# Patient Record
Sex: FEMALE | Race: WHITE | Employment: OTHER | RURAL
[De-identification: names, ages, dates, MRNs, and addresses within clinical notes are randomized per-mention and may not be internally consistent; named-entity substitution may affect disease eponyms.]

---

## 2024-04-18 ENCOUNTER — HOSPITAL ENCOUNTER (EMERGENCY)
Facility: HOSPITAL | Age: 89
Discharge: HOME | End: 2024-04-18
Attending: EMERGENCY MEDICINE
Payer: MEDICARE

## 2024-04-18 ENCOUNTER — APPOINTMENT (OUTPATIENT)
Facility: HOSPITAL | Age: 89
End: 2024-04-18
Payer: MEDICARE

## 2024-04-18 VITALS
DIASTOLIC BLOOD PRESSURE: 49 MMHG | RESPIRATION RATE: 18 BRPM | HEART RATE: 92 BPM | BODY MASS INDEX: 32.15 KG/M2 | WEIGHT: 193 LBS | TEMPERATURE: 98 F | OXYGEN SATURATION: 97 % | SYSTOLIC BLOOD PRESSURE: 165 MMHG | HEIGHT: 65 IN

## 2024-04-18 DIAGNOSIS — I10 UNCONTROLLED HYPERTENSION: Primary | ICD-10-CM

## 2024-04-18 LAB
ANION GAP SERPL CALC-SCNC: 10 MMOL/L (ref 5–15)
BASOPHILS # BLD: 0.1 K/UL (ref 0–0.1)
BASOPHILS NFR BLD: 1 % (ref 0–1)
BUN SERPL-MCNC: 17 MG/DL (ref 6–20)
BUN/CREAT SERPL: 12 (ref 12–20)
CALCIUM SERPL-MCNC: 9.3 MG/DL (ref 8.5–10.1)
CHLORIDE SERPL-SCNC: 105 MMOL/L (ref 97–108)
CO2 SERPL-SCNC: 28 MMOL/L (ref 21–32)
CREAT SERPL-MCNC: 1.38 MG/DL (ref 0.55–1.02)
DIFFERENTIAL METHOD BLD: NORMAL
ECHO BSA: 2 M2
EKG ATRIAL RATE: 90 BPM
EKG DIAGNOSIS: NORMAL
EKG P AXIS: 43 DEGREES
EKG P-R INTERVAL: 170 MS
EKG Q-T INTERVAL: 410 MS
EKG QRS DURATION: 150 MS
EKG QTC CALCULATION (BAZETT): 501 MS
EKG R AXIS: -26 DEGREES
EKG T AXIS: 138 DEGREES
EKG VENTRICULAR RATE: 90 BPM
EOSINOPHIL # BLD: 0.3 K/UL (ref 0–0.4)
EOSINOPHIL NFR BLD: 3 % (ref 0–7)
ERYTHROCYTE [DISTWIDTH] IN BLOOD BY AUTOMATED COUNT: 12.5 % (ref 11.5–14.5)
GLUCOSE SERPL-MCNC: 109 MG/DL (ref 65–100)
HCT VFR BLD AUTO: 43.8 % (ref 35–47)
HGB BLD-MCNC: 14.9 G/DL (ref 11.5–16)
IMM GRANULOCYTES # BLD AUTO: 0 K/UL (ref 0–0.04)
IMM GRANULOCYTES NFR BLD AUTO: 0 % (ref 0–0.5)
LYMPHOCYTES # BLD: 1.6 K/UL (ref 0.8–3.5)
LYMPHOCYTES NFR BLD: 21 % (ref 12–49)
MCH RBC QN AUTO: 29.3 PG (ref 26–34)
MCHC RBC AUTO-ENTMCNC: 34 G/DL (ref 30–36.5)
MCV RBC AUTO: 86.1 FL (ref 80–99)
MONOCYTES # BLD: 0.7 K/UL (ref 0–1)
MONOCYTES NFR BLD: 9 % (ref 5–13)
NEUTS SEG # BLD: 5.3 K/UL (ref 1.8–8)
NEUTS SEG NFR BLD: 66 % (ref 32–75)
NRBC # BLD: 0 K/UL (ref 0–0.01)
NRBC BLD-RTO: 0 PER 100 WBC
PLATELET # BLD AUTO: 223 K/UL (ref 150–400)
PMV BLD AUTO: 9.7 FL (ref 8.9–12.9)
POTASSIUM SERPL-SCNC: 4.2 MMOL/L (ref 3.5–5.1)
RBC # BLD AUTO: 5.09 M/UL (ref 3.8–5.2)
SODIUM SERPL-SCNC: 143 MMOL/L (ref 136–145)
WBC # BLD AUTO: 7.9 K/UL (ref 3.6–11)

## 2024-04-18 PROCEDURE — 85025 COMPLETE CBC W/AUTO DIFF WBC: CPT

## 2024-04-18 PROCEDURE — 93971 EXTREMITY STUDY: CPT

## 2024-04-18 PROCEDURE — 36415 COLL VENOUS BLD VENIPUNCTURE: CPT

## 2024-04-18 PROCEDURE — 6370000000 HC RX 637 (ALT 250 FOR IP): Performed by: EMERGENCY MEDICINE

## 2024-04-18 PROCEDURE — 80048 BASIC METABOLIC PNL TOTAL CA: CPT

## 2024-04-18 PROCEDURE — 99284 EMERGENCY DEPT VISIT MOD MDM: CPT

## 2024-04-18 RX ORDER — AMLODIPINE BESYLATE 5 MG/1
5 TABLET ORAL DAILY
Qty: 30 TABLET | Refills: 0 | Status: SHIPPED | OUTPATIENT
Start: 2024-04-18

## 2024-04-18 RX ORDER — AMLODIPINE BESYLATE 5 MG/1
5 TABLET ORAL
Status: COMPLETED | OUTPATIENT
Start: 2024-04-18 | End: 2024-04-18

## 2024-04-18 RX ADMIN — AMLODIPINE BESYLATE 5 MG: 5 TABLET ORAL at 13:34

## 2024-04-18 ASSESSMENT — LIFESTYLE VARIABLES
HOW MANY STANDARD DRINKS CONTAINING ALCOHOL DO YOU HAVE ON A TYPICAL DAY: PATIENT DOES NOT DRINK
HOW OFTEN DO YOU HAVE A DRINK CONTAINING ALCOHOL: NEVER

## 2024-04-18 ASSESSMENT — PAIN - FUNCTIONAL ASSESSMENT: PAIN_FUNCTIONAL_ASSESSMENT: NONE - DENIES PAIN

## 2024-04-18 NOTE — ED PROVIDER NOTES
[facility] EMERGENCY DEP  EMERGENCY DEPARTMENT ENCOUNTER       Pt Name: Yana Esquivel  MRN: [id number]  Birthdate 1935  Date of evaluation: 4/18/2024  Provider: Chloe Soto MD   PCP: No primary care provider on file.  Note Started: 4:20 PM EDT 4/18/24     CHIEF COMPLAINT       Chief Complaint   Patient presents with    Hypertension        HISTORY OF PRESENT ILLNESS: 1 or more elements      History From: Patient, History limited by: none     Yana Esquivel is a 88 y.o. female presents to the emergency department complaining of elevated blood pressure and leg swelling.  Patient reports she has noted ankle swelling, left greater than right.  Reports she was told by her primary care doctor to stop taking her blood pressure medication in December.  Has not taken amlodipine since that time.  Also reports she had an episode on Monday where she felt like her head felt heavy.  Lasted only for few seconds but prompted her to check her blood pressure at which time she noted that it was high.  No calf pain.  No fever.  No headache, chest pain, shortness of breath.       Please See MDM for Additional Details of the HPI/PMH  Nursing Notes were all reviewed and agreed with or any disagreements were addressed in the HPI.     REVIEW OF SYSTEMS        Positives and Pertinent negatives as per HPI.    PAST HISTORY     Past Medical History:  Past Medical History:   Diagnosis Date    Diabetes (HCC)     Hypertension        Past Surgical History:  Past Surgical History:   Procedure Laterality Date    CHOLECYSTECTOMY         Family History:  No family history on file.    Social History:  Social History     Tobacco Use    Smoking status: Never    Smokeless tobacco: Never   Substance Use Topics    Alcohol use: Not Currently    Drug use: Never       Allergies:  Allergies   Allergen Reactions    Codeine Rash    Penicillins Rash       CURRENT MEDICATIONS      Discharge Medication List as of 4/18/2024  2:39 PM          SCREENINGS

## 2024-04-18 NOTE — ED TRIAGE NOTES
Pt reports that she has a pmhx of htn and DM. Pt states that her PCP told her she was not diabetic anymore and took her off of her rx meds, glipizide, losartan, and amlodipine. Pt states that she had an \"episode\" on Monday where her head felt heavy, which prompted her to check her BP, at that time her bp was 195/104. It has continued  to be elevated.  Pt also reports some ankle swelling.

## 2024-04-24 LAB
EKG ATRIAL RATE: 90 BPM
EKG DIAGNOSIS: NORMAL
EKG P AXIS: 43 DEGREES
EKG P-R INTERVAL: 170 MS
EKG Q-T INTERVAL: 410 MS
EKG QRS DURATION: 150 MS
EKG QTC CALCULATION (BAZETT): 501 MS
EKG R AXIS: -26 DEGREES
EKG T AXIS: 138 DEGREES
EKG VENTRICULAR RATE: 90 BPM

## 2024-09-12 ENCOUNTER — APPOINTMENT (OUTPATIENT)
Facility: HOSPITAL | Age: 89
End: 2024-09-12
Payer: MEDICARE

## 2024-09-12 ENCOUNTER — HOSPITAL ENCOUNTER (EMERGENCY)
Facility: HOSPITAL | Age: 89
Discharge: ANOTHER HOSPITAL | End: 2024-09-12
Attending: EMERGENCY MEDICINE
Payer: MEDICARE

## 2024-09-12 ENCOUNTER — HOSPITAL ENCOUNTER (INPATIENT)
Facility: HOSPITAL | Age: 89
LOS: 2 days | Discharge: HOME | End: 2024-09-14
Attending: STUDENT IN AN ORGANIZED HEALTH CARE EDUCATION/TRAINING PROGRAM | Admitting: STUDENT IN AN ORGANIZED HEALTH CARE EDUCATION/TRAINING PROGRAM
Payer: MEDICARE

## 2024-09-12 VITALS
HEIGHT: 65 IN | RESPIRATION RATE: 19 BRPM | BODY MASS INDEX: 30.51 KG/M2 | HEART RATE: 83 BPM | DIASTOLIC BLOOD PRESSURE: 64 MMHG | WEIGHT: 183.1 LBS | SYSTOLIC BLOOD PRESSURE: 122 MMHG | OXYGEN SATURATION: 98 % | TEMPERATURE: 98 F

## 2024-09-12 DIAGNOSIS — I21.4 NSTEMI (NON-ST ELEVATED MYOCARDIAL INFARCTION) (HCC): Primary | ICD-10-CM

## 2024-09-12 DIAGNOSIS — I47.10 PAROXYSMAL SUPRAVENTRICULAR TACHYCARDIA (HCC): ICD-10-CM

## 2024-09-12 DIAGNOSIS — I24.9 ACUTE CORONARY SYNDROME (HCC): ICD-10-CM

## 2024-09-12 LAB
ALBUMIN SERPL-MCNC: 3.6 G/DL (ref 3.5–5)
ALBUMIN SERPL-MCNC: 3.6 G/DL (ref 3.5–5)
ALBUMIN/GLOB SERPL: 1.2 (ref 1.1–2.2)
ALBUMIN/GLOB SERPL: 1.2 (ref 1.1–2.2)
ALP SERPL-CCNC: 61 U/L (ref 45–117)
ALP SERPL-CCNC: 65 U/L (ref 45–117)
ALT SERPL-CCNC: 17 U/L (ref 12–78)
ALT SERPL-CCNC: 18 U/L (ref 12–78)
ANION GAP SERPL CALC-SCNC: 11 MMOL/L (ref 2–12)
ANION GAP SERPL CALC-SCNC: 7 MMOL/L (ref 2–12)
APPEARANCE UR: CLEAR
AST SERPL-CCNC: 18 U/L (ref 15–37)
AST SERPL-CCNC: 21 U/L (ref 15–37)
BACTERIA URNS QL MICRO: NEGATIVE /HPF
BASOPHILS # BLD: 0.1 K/UL (ref 0–0.1)
BASOPHILS # BLD: 0.1 K/UL (ref 0–0.1)
BASOPHILS NFR BLD: 0 % (ref 0–1)
BASOPHILS NFR BLD: 1 % (ref 0–1)
BILIRUB SERPL-MCNC: 0.7 MG/DL (ref 0.2–1)
BILIRUB SERPL-MCNC: 0.8 MG/DL (ref 0.2–1)
BILIRUB UR QL: NEGATIVE
BUN SERPL-MCNC: 16 MG/DL (ref 6–20)
BUN SERPL-MCNC: 18 MG/DL (ref 6–20)
BUN/CREAT SERPL: 11 (ref 12–20)
BUN/CREAT SERPL: 13 (ref 12–20)
CALCIUM SERPL-MCNC: 9.1 MG/DL (ref 8.5–10.1)
CALCIUM SERPL-MCNC: 9.5 MG/DL (ref 8.5–10.1)
CHLORIDE SERPL-SCNC: 102 MMOL/L (ref 97–108)
CHLORIDE SERPL-SCNC: 108 MMOL/L (ref 97–108)
CO2 SERPL-SCNC: 26 MMOL/L (ref 21–32)
CO2 SERPL-SCNC: 26 MMOL/L (ref 21–32)
COLOR UR: ABNORMAL
CREAT SERPL-MCNC: 1.35 MG/DL (ref 0.55–1.02)
CREAT SERPL-MCNC: 1.45 MG/DL (ref 0.55–1.02)
D DIMER PPP FEU-MCNC: 0.8 MG/L FEU (ref 0–0.65)
DIFFERENTIAL METHOD BLD: ABNORMAL
DIFFERENTIAL METHOD BLD: ABNORMAL
EOSINOPHIL # BLD: 0.2 K/UL (ref 0–0.4)
EOSINOPHIL # BLD: 0.3 K/UL (ref 0–0.4)
EOSINOPHIL NFR BLD: 1 % (ref 0–7)
EOSINOPHIL NFR BLD: 3 % (ref 0–7)
EPITH CASTS URNS QL MICRO: ABNORMAL /LPF
ERYTHROCYTE [DISTWIDTH] IN BLOOD BY AUTOMATED COUNT: 12.3 % (ref 11.5–14.5)
ERYTHROCYTE [DISTWIDTH] IN BLOOD BY AUTOMATED COUNT: 12.3 % (ref 11.5–14.5)
FLUAV RNA SPEC QL NAA+PROBE: NOT DETECTED
FLUBV RNA SPEC QL NAA+PROBE: NOT DETECTED
GLOBULIN SER CALC-MCNC: 3 G/DL (ref 2–4)
GLOBULIN SER CALC-MCNC: 3.1 G/DL (ref 2–4)
GLUCOSE SERPL-MCNC: 123 MG/DL (ref 65–100)
GLUCOSE SERPL-MCNC: 201 MG/DL (ref 65–100)
GLUCOSE UR STRIP.AUTO-MCNC: NEGATIVE MG/DL
HCT VFR BLD AUTO: 36.6 % (ref 35–47)
HCT VFR BLD AUTO: 41.1 % (ref 35–47)
HGB BLD-MCNC: 12.8 G/DL (ref 11.5–16)
HGB BLD-MCNC: 14.4 G/DL (ref 11.5–16)
HGB UR QL STRIP: NEGATIVE
IMM GRANULOCYTES # BLD AUTO: 0 K/UL (ref 0–0.04)
IMM GRANULOCYTES # BLD AUTO: 0.1 K/UL (ref 0–0.04)
IMM GRANULOCYTES NFR BLD AUTO: 0 % (ref 0–0.5)
IMM GRANULOCYTES NFR BLD AUTO: 0 % (ref 0–0.5)
KETONES UR QL STRIP.AUTO: NEGATIVE MG/DL
LEUKOCYTE ESTERASE UR QL STRIP.AUTO: NEGATIVE
LIPASE SERPL-CCNC: 63 U/L (ref 13–75)
LYMPHOCYTES # BLD: 2.1 K/UL (ref 0.8–3.5)
LYMPHOCYTES # BLD: 2.4 K/UL (ref 0.8–3.5)
LYMPHOCYTES NFR BLD: 17 % (ref 12–49)
LYMPHOCYTES NFR BLD: 19 % (ref 12–49)
MAGNESIUM SERPL-MCNC: 2 MG/DL (ref 1.6–2.4)
MCH RBC QN AUTO: 30.5 PG (ref 26–34)
MCH RBC QN AUTO: 30.8 PG (ref 26–34)
MCHC RBC AUTO-ENTMCNC: 35 G/DL (ref 30–36.5)
MCHC RBC AUTO-ENTMCNC: 35 G/DL (ref 30–36.5)
MCV RBC AUTO: 87.1 FL (ref 80–99)
MCV RBC AUTO: 88 FL (ref 80–99)
MONOCYTES # BLD: 0.8 K/UL (ref 0–1)
MONOCYTES # BLD: 1 K/UL (ref 0–1)
MONOCYTES NFR BLD: 6 % (ref 5–13)
MONOCYTES NFR BLD: 8 % (ref 5–13)
NEUTS SEG # BLD: 8.9 K/UL (ref 1.8–8)
NEUTS SEG # BLD: 9.1 K/UL (ref 1.8–8)
NEUTS SEG NFR BLD: 72 % (ref 32–75)
NEUTS SEG NFR BLD: 73 % (ref 32–75)
NITRITE UR QL STRIP.AUTO: NEGATIVE
NRBC # BLD: 0 K/UL (ref 0–0.01)
NRBC # BLD: 0 K/UL (ref 0–0.01)
NRBC BLD-RTO: 0 PER 100 WBC
NRBC BLD-RTO: 0 PER 100 WBC
PH UR STRIP: 6 (ref 5–8)
PLATELET # BLD AUTO: 263 K/UL (ref 150–400)
PLATELET # BLD AUTO: 290 K/UL (ref 150–400)
PMV BLD AUTO: 9.2 FL (ref 8.9–12.9)
PMV BLD AUTO: 9.3 FL (ref 8.9–12.9)
POTASSIUM SERPL-SCNC: 3.9 MMOL/L (ref 3.5–5.1)
POTASSIUM SERPL-SCNC: 4.4 MMOL/L (ref 3.5–5.1)
PROT SERPL-MCNC: 6.6 G/DL (ref 6.4–8.2)
PROT SERPL-MCNC: 6.7 G/DL (ref 6.4–8.2)
PROT UR STRIP-MCNC: ABNORMAL MG/DL
RBC # BLD AUTO: 4.16 M/UL (ref 3.8–5.2)
RBC # BLD AUTO: 4.72 M/UL (ref 3.8–5.2)
RBC #/AREA URNS HPF: ABNORMAL /HPF (ref 0–5)
SARS-COV-2 RNA RESP QL NAA+PROBE: NOT DETECTED
SODIUM SERPL-SCNC: 139 MMOL/L (ref 136–145)
SODIUM SERPL-SCNC: 141 MMOL/L (ref 136–145)
SOURCE: NORMAL
SP GR UR REFRACTOMETRY: 1.02 (ref 1–1.03)
TROPONIN I SERPL HS-MCNC: 314 NG/L (ref 0–51)
TROPONIN I SERPL HS-MCNC: 41 NG/L (ref 0–51)
TROPONIN I SERPL HS-MCNC: 791 NG/L (ref 0–51)
TSH SERPL DL<=0.05 MIU/L-ACNC: 1.5 UIU/ML (ref 0.36–3.74)
URINE CULTURE IF INDICATED: ABNORMAL
UROBILINOGEN UR QL STRIP.AUTO: 0.2 EU/DL (ref 0.2–1)
WBC # BLD AUTO: 12.2 K/UL (ref 3.6–11)
WBC # BLD AUTO: 12.8 K/UL (ref 3.6–11)
WBC URNS QL MICRO: ABNORMAL /HPF (ref 0–4)

## 2024-09-12 PROCEDURE — 85025 COMPLETE CBC W/AUTO DIFF WBC: CPT

## 2024-09-12 PROCEDURE — 6360000004 HC RX CONTRAST MEDICATION: Performed by: RADIOLOGY

## 2024-09-12 PROCEDURE — 81001 URINALYSIS AUTO W/SCOPE: CPT

## 2024-09-12 PROCEDURE — 2060000000 HC ICU INTERMEDIATE R&B

## 2024-09-12 PROCEDURE — 96372 THER/PROPH/DIAG INJ SC/IM: CPT

## 2024-09-12 PROCEDURE — 6360000002 HC RX W HCPCS: Performed by: EMERGENCY MEDICINE

## 2024-09-12 PROCEDURE — 83735 ASSAY OF MAGNESIUM: CPT

## 2024-09-12 PROCEDURE — 93005 ELECTROCARDIOGRAM TRACING: CPT | Performed by: EMERGENCY MEDICINE

## 2024-09-12 PROCEDURE — 83036 HEMOGLOBIN GLYCOSYLATED A1C: CPT

## 2024-09-12 PROCEDURE — 84484 ASSAY OF TROPONIN QUANT: CPT

## 2024-09-12 PROCEDURE — 2500000003 HC RX 250 WO HCPCS: Performed by: STUDENT IN AN ORGANIZED HEALTH CARE EDUCATION/TRAINING PROGRAM

## 2024-09-12 PROCEDURE — 85379 FIBRIN DEGRADATION QUANT: CPT

## 2024-09-12 PROCEDURE — 93005 ELECTROCARDIOGRAM TRACING: CPT | Performed by: STUDENT IN AN ORGANIZED HEALTH CARE EDUCATION/TRAINING PROGRAM

## 2024-09-12 PROCEDURE — 36415 COLL VENOUS BLD VENIPUNCTURE: CPT

## 2024-09-12 PROCEDURE — 80053 COMPREHEN METABOLIC PANEL: CPT

## 2024-09-12 PROCEDURE — 2580000003 HC RX 258: Performed by: STUDENT IN AN ORGANIZED HEALTH CARE EDUCATION/TRAINING PROGRAM

## 2024-09-12 PROCEDURE — 99285 EMERGENCY DEPT VISIT HI MDM: CPT

## 2024-09-12 PROCEDURE — 83690 ASSAY OF LIPASE: CPT

## 2024-09-12 PROCEDURE — 6370000000 HC RX 637 (ALT 250 FOR IP): Performed by: EMERGENCY MEDICINE

## 2024-09-12 PROCEDURE — 87636 SARSCOV2 & INF A&B AMP PRB: CPT

## 2024-09-12 PROCEDURE — 84443 ASSAY THYROID STIM HORMONE: CPT

## 2024-09-12 PROCEDURE — 71275 CT ANGIOGRAPHY CHEST: CPT

## 2024-09-12 PROCEDURE — 71046 X-RAY EXAM CHEST 2 VIEWS: CPT

## 2024-09-12 PROCEDURE — 96374 THER/PROPH/DIAG INJ IV PUSH: CPT

## 2024-09-12 PROCEDURE — 93970 EXTREMITY STUDY: CPT

## 2024-09-12 PROCEDURE — 80061 LIPID PANEL: CPT

## 2024-09-12 RX ORDER — HEPARIN SODIUM 1000 [USP'U]/ML
30 INJECTION, SOLUTION INTRAVENOUS; SUBCUTANEOUS PRN
Status: DISCONTINUED | OUTPATIENT
Start: 2024-09-12 | End: 2024-09-12

## 2024-09-12 RX ORDER — ATORVASTATIN CALCIUM 40 MG/1
80 TABLET, FILM COATED ORAL DAILY
Status: DISCONTINUED | OUTPATIENT
Start: 2024-09-13 | End: 2024-09-13

## 2024-09-12 RX ORDER — HEPARIN SODIUM 1000 [USP'U]/ML
2000 INJECTION, SOLUTION INTRAVENOUS; SUBCUTANEOUS PRN
Status: DISCONTINUED | OUTPATIENT
Start: 2024-09-12 | End: 2024-09-13

## 2024-09-12 RX ORDER — HEPARIN SODIUM 10000 [USP'U]/100ML
5-30 INJECTION, SOLUTION INTRAVENOUS CONTINUOUS
Status: DISCONTINUED | OUTPATIENT
Start: 2024-09-13 | End: 2024-09-13 | Stop reason: SDUPTHER

## 2024-09-12 RX ORDER — SODIUM CHLORIDE 0.9 % (FLUSH) 0.9 %
5-40 SYRINGE (ML) INJECTION EVERY 12 HOURS SCHEDULED
Status: DISCONTINUED | OUTPATIENT
Start: 2024-09-12 | End: 2024-09-14 | Stop reason: HOSPADM

## 2024-09-12 RX ORDER — ONDANSETRON 4 MG/1
4 TABLET, ORALLY DISINTEGRATING ORAL EVERY 8 HOURS PRN
Status: DISCONTINUED | OUTPATIENT
Start: 2024-09-12 | End: 2024-09-14 | Stop reason: HOSPADM

## 2024-09-12 RX ORDER — LOSARTAN POTASSIUM 50 MG/1
50 TABLET ORAL DAILY
Status: DISCONTINUED | OUTPATIENT
Start: 2024-09-12 | End: 2024-09-14 | Stop reason: HOSPADM

## 2024-09-12 RX ORDER — ATORVASTATIN CALCIUM 40 MG/1
40 TABLET, FILM COATED ORAL DAILY
COMMUNITY

## 2024-09-12 RX ORDER — SODIUM CHLORIDE 9 MG/ML
INJECTION, SOLUTION INTRAVENOUS PRN
Status: DISCONTINUED | OUTPATIENT
Start: 2024-09-12 | End: 2024-09-14 | Stop reason: HOSPADM

## 2024-09-12 RX ORDER — HEPARIN SODIUM 10000 [USP'U]/100ML
5-30 INJECTION, SOLUTION INTRAVENOUS CONTINUOUS
Status: DISCONTINUED | OUTPATIENT
Start: 2024-09-12 | End: 2024-09-12

## 2024-09-12 RX ORDER — IOPAMIDOL 755 MG/ML
100 INJECTION, SOLUTION INTRAVASCULAR
Status: COMPLETED | OUTPATIENT
Start: 2024-09-12 | End: 2024-09-12

## 2024-09-12 RX ORDER — ASPIRIN 81 MG/1
324 TABLET, CHEWABLE ORAL ONCE
Status: COMPLETED | OUTPATIENT
Start: 2024-09-12 | End: 2024-09-12

## 2024-09-12 RX ORDER — BENZONATATE 100 MG/1
100 CAPSULE ORAL 3 TIMES DAILY PRN
Status: DISCONTINUED | OUTPATIENT
Start: 2024-09-12 | End: 2024-09-14 | Stop reason: HOSPADM

## 2024-09-12 RX ORDER — ONDANSETRON 2 MG/ML
4 INJECTION INTRAMUSCULAR; INTRAVENOUS EVERY 4 HOURS PRN
Status: DISCONTINUED | OUTPATIENT
Start: 2024-09-12 | End: 2024-09-12 | Stop reason: SDUPTHER

## 2024-09-12 RX ORDER — DILTIAZEM HYDROCHLORIDE 5 MG/ML
10 INJECTION INTRAVENOUS
Status: COMPLETED | OUTPATIENT
Start: 2024-09-12 | End: 2024-09-12

## 2024-09-12 RX ORDER — ASPIRIN 81 MG/1
81 TABLET, CHEWABLE ORAL DAILY
Status: DISCONTINUED | OUTPATIENT
Start: 2024-09-13 | End: 2024-09-14 | Stop reason: HOSPADM

## 2024-09-12 RX ORDER — HEPARIN SODIUM 1000 [USP'U]/ML
60 INJECTION, SOLUTION INTRAVENOUS; SUBCUTANEOUS PRN
Status: DISCONTINUED | OUTPATIENT
Start: 2024-09-12 | End: 2024-09-12

## 2024-09-12 RX ORDER — 0.9 % SODIUM CHLORIDE 0.9 %
1000 INTRAVENOUS SOLUTION INTRAVENOUS ONCE
Status: COMPLETED | OUTPATIENT
Start: 2024-09-12 | End: 2024-09-12

## 2024-09-12 RX ORDER — HEPARIN SODIUM 1000 [USP'U]/ML
4000 INJECTION, SOLUTION INTRAVENOUS; SUBCUTANEOUS PRN
Status: DISCONTINUED | OUTPATIENT
Start: 2024-09-12 | End: 2024-09-13

## 2024-09-12 RX ORDER — ALBUTEROL SULFATE 90 UG/1
2 INHALANT RESPIRATORY (INHALATION) EVERY 6 HOURS PRN
COMMUNITY

## 2024-09-12 RX ORDER — SODIUM CHLORIDE 0.9 % (FLUSH) 0.9 %
5-40 SYRINGE (ML) INJECTION PRN
Status: DISCONTINUED | OUTPATIENT
Start: 2024-09-12 | End: 2024-09-14 | Stop reason: HOSPADM

## 2024-09-12 RX ORDER — ACETAMINOPHEN 325 MG/1
650 TABLET ORAL EVERY 6 HOURS PRN
Status: DISCONTINUED | OUTPATIENT
Start: 2024-09-12 | End: 2024-09-14 | Stop reason: HOSPADM

## 2024-09-12 RX ORDER — LOSARTAN POTASSIUM 50 MG/1
50 TABLET ORAL DAILY
COMMUNITY

## 2024-09-12 RX ORDER — LIDOCAINE HYDROCHLORIDE 20 MG/ML
15 SOLUTION OROPHARYNGEAL
Status: COMPLETED | OUTPATIENT
Start: 2024-09-12 | End: 2024-09-12

## 2024-09-12 RX ORDER — LANOLIN ALCOHOL/MO/W.PET/CERES
3 CREAM (GRAM) TOPICAL NIGHTLY PRN
Status: DISCONTINUED | OUTPATIENT
Start: 2024-09-12 | End: 2024-09-14 | Stop reason: HOSPADM

## 2024-09-12 RX ORDER — ACETAMINOPHEN 325 MG/1
650 TABLET ORAL EVERY 4 HOURS PRN
Status: DISCONTINUED | OUTPATIENT
Start: 2024-09-12 | End: 2024-09-12

## 2024-09-12 RX ORDER — ACETAMINOPHEN 650 MG/1
650 SUPPOSITORY RECTAL EVERY 6 HOURS PRN
Status: DISCONTINUED | OUTPATIENT
Start: 2024-09-12 | End: 2024-09-14 | Stop reason: HOSPADM

## 2024-09-12 RX ORDER — POLYETHYLENE GLYCOL 3350 17 G/17G
17 POWDER, FOR SOLUTION ORAL DAILY PRN
Status: DISCONTINUED | OUTPATIENT
Start: 2024-09-12 | End: 2024-09-14 | Stop reason: HOSPADM

## 2024-09-12 RX ORDER — MAGNESIUM HYDROXIDE/ALUMINUM HYDROXICE/SIMETHICONE 120; 1200; 1200 MG/30ML; MG/30ML; MG/30ML
30 SUSPENSION ORAL ONCE
Status: COMPLETED | OUTPATIENT
Start: 2024-09-12 | End: 2024-09-12

## 2024-09-12 RX ORDER — ONDANSETRON 2 MG/ML
4 INJECTION INTRAMUSCULAR; INTRAVENOUS EVERY 6 HOURS PRN
Status: DISCONTINUED | OUTPATIENT
Start: 2024-09-12 | End: 2024-09-14 | Stop reason: HOSPADM

## 2024-09-12 RX ORDER — ENOXAPARIN SODIUM 100 MG/ML
1 INJECTION SUBCUTANEOUS
Status: COMPLETED | OUTPATIENT
Start: 2024-09-12 | End: 2024-09-12

## 2024-09-12 RX ORDER — HEPARIN SODIUM 1000 [USP'U]/ML
60 INJECTION, SOLUTION INTRAVENOUS; SUBCUTANEOUS ONCE
Status: DISCONTINUED | OUTPATIENT
Start: 2024-09-12 | End: 2024-09-12

## 2024-09-12 RX ADMIN — SODIUM CHLORIDE, PRESERVATIVE FREE 10 ML: 5 INJECTION INTRAVENOUS at 22:20

## 2024-09-12 RX ADMIN — LIDOCAINE HYDROCHLORIDE 15 ML: 20 SOLUTION ORAL at 10:20

## 2024-09-12 RX ADMIN — IOPAMIDOL 100 ML: 755 INJECTION, SOLUTION INTRAVENOUS at 20:57

## 2024-09-12 RX ADMIN — ASPIRIN 324 MG: 81 TABLET, CHEWABLE ORAL at 13:06

## 2024-09-12 RX ADMIN — DILTIAZEM HYDROCHLORIDE 10 MG: 5 INJECTION, SOLUTION INTRAVENOUS at 18:18

## 2024-09-12 RX ADMIN — SODIUM CHLORIDE 1000 ML: 9 INJECTION, SOLUTION INTRAVENOUS at 18:19

## 2024-09-12 RX ADMIN — ALUMINUM HYDROXIDE, MAGNESIUM HYDROXIDE, AND SIMETHICONE 30 ML: 1200; 120; 1200 SUSPENSION ORAL at 10:20

## 2024-09-12 RX ADMIN — ENOXAPARIN SODIUM 80 MG: 100 INJECTION SUBCUTANEOUS at 14:44

## 2024-09-12 ASSESSMENT — LIFESTYLE VARIABLES
HOW MANY STANDARD DRINKS CONTAINING ALCOHOL DO YOU HAVE ON A TYPICAL DAY: PATIENT DOES NOT DRINK
HOW OFTEN DO YOU HAVE A DRINK CONTAINING ALCOHOL: NEVER
HOW OFTEN DO YOU HAVE A DRINK CONTAINING ALCOHOL: NEVER
HOW MANY STANDARD DRINKS CONTAINING ALCOHOL DO YOU HAVE ON A TYPICAL DAY: PATIENT DOES NOT DRINK

## 2024-09-12 ASSESSMENT — PAIN SCALES - GENERAL
PAINLEVEL_OUTOF10: 6
PAINLEVEL_OUTOF10: 0
PAINLEVEL_OUTOF10: 6

## 2024-09-12 ASSESSMENT — PAIN - FUNCTIONAL ASSESSMENT: PAIN_FUNCTIONAL_ASSESSMENT: 0-10

## 2024-09-12 NOTE — ED NOTES
TRANSFER - OUT REPORT:    Verbal report given to Ramon Carpio EMT-P on Yana Esquivel  being transferred to [facility] 2115 for routine progression of patient care       Report consisted of patient's Situation, Background, Assessment and   Recommendations(SBAR).     Information from the following report(s) Nurse Handoff Report, ED SBAR, MAR, Recent Results, Med Rec Status, Cardiac Rhythm SNR w LBBB, and Quality Measures was reviewed with the receiving nurse.    [place] Fall Assessment:    Presents to emergency department  because of falls (Syncope, seizure, or loss of consciousness): No  Age > 70: Yes  Altered Mental Status, Intoxication with alcohol or substance confusion (Disorientation, impaired judgment, poor safety awaremess, or inability to follow instructions): No  Impaired Mobility: Ambulates or transfers with assistive devices or assistance; Unable to ambulate or transer.: No  Nursing Judgement: No          Lines:   Peripheral IV 09/12/24 Left Antecubital (Active)        Opportunity for questions and clarification was provided.      Patient transported with:  [facility] on continued cardiac monitoring, hemodynamic monitoring

## 2024-09-12 NOTE — ED NOTES
ED Provider at bedside to discuss with pt about plan of care, transfer to see if pt is able to be agreeable to current plan of transport to [facility] for further evaluation/cath lab. Pt expresses anxiety, reticence about cardiac catheterization.

## 2024-09-12 NOTE — ED NOTES
Meal tray has arrived for pt, ED Provider to evaluate before pt is cleared related to increased troponin.

## 2024-09-12 NOTE — ED NOTES
Pt refusal of further interventions right now until she can talk to her son. Pt now calling her son on her cell phone at this time. Pt initial refusal of lovenox. Educated on lovenox.

## 2024-09-12 NOTE — ED NOTES
TRANSFER - OUT REPORT:    Verbal report given to Lori Pike RN on Yana Esquivel  being transferred to [facility] 2115 for routine progression of patient care       Report consisted of patient's Situation, Background, Assessment and   Recommendations(SBAR).     Information from the following report(s) Nurse Handoff Report, ED SBAR, Adult Overview, MAR, Recent Results, Med Rec Status, Cardiac Rhythm NSR, and Quality Measures was reviewed with the receiving nurse.    [place] Fall Assessment:    Presents to emergency department  because of falls (Syncope, seizure, or loss of consciousness): No  Age > 70: Yes  Altered Mental Status, Intoxication with alcohol or substance confusion (Disorientation, impaired judgment, poor safety awaremess, or inability to follow instructions): No  Impaired Mobility: Ambulates or transfers with assistive devices or assistance; Unable to ambulate or transer.: No  Nursing Judgement: No          Lines:   Peripheral IV 09/12/24 Left Antecubital (Active)        Opportunity for questions and clarification was provided.      Patient transported with:  [facility] on Cardiac monitoring, continued hemodynamic monitoring.

## 2024-09-12 NOTE — ED TRIAGE NOTES
Pt arrives with c/o CP, upon triage pt reports right shoulder, jaw, arm pain that began approx 1.5 hours ago. Pt reports that she is diaphoretic.

## 2024-09-12 NOTE — ED NOTES
Pt has received bed assignment, [facility] Bed 2115, number for report [phone]. RN Supervisor aware, to arrange transport for pt at this time.

## 2024-09-12 NOTE — ED NOTES
[phone] This RN attempting to give report on pt being transferred to [facility] Bed 2115, x 2 attempts at this time, on hold.

## 2024-09-12 NOTE — ED NOTES
ED Provider at bedside to discuss results/plan of care and transfer to appropriate facility to pt at this time. Pt is somewhat reticent about transfer at this time.

## 2024-09-12 NOTE — ED NOTES
Pt still waiting to speak with ED Provider at this time and her son who she wants to join by phone. Pt would like this to occur before any further interventions.

## 2024-09-12 NOTE — ED NOTES
Per RN Supervisor, [id number] ETA to  pt is 1700. Pt reports wanting a cough drop or something for her throat right now. ED Provider informed.

## 2024-09-12 NOTE — PROGRESS NOTES
arranged ALS transportation from [facility] # ed 6 to [facility] 3265.     Arranged ALS transport w/[facility] -  advised to bring appropiate equipment - ETA of   1700 given - updated ED staff w/ETA

## 2024-09-13 ENCOUNTER — APPOINTMENT (OUTPATIENT)
Facility: HOSPITAL | Age: 89
End: 2024-09-13
Attending: STUDENT IN AN ORGANIZED HEALTH CARE EDUCATION/TRAINING PROGRAM
Payer: MEDICARE

## 2024-09-13 LAB
ANION GAP SERPL CALC-SCNC: 5 MMOL/L (ref 2–12)
APTT PPP: 29.2 SEC (ref 22.1–31)
APTT PPP: 51.1 SEC (ref 22.1–31)
BASOPHILS # BLD: 0.1 K/UL (ref 0–0.1)
BASOPHILS NFR BLD: 1 % (ref 0–1)
BUN SERPL-MCNC: 14 MG/DL (ref 6–20)
BUN/CREAT SERPL: 12 (ref 12–20)
CALCIUM SERPL-MCNC: 8.5 MG/DL (ref 8.5–10.1)
CHLORIDE SERPL-SCNC: 109 MMOL/L (ref 97–108)
CHOLEST SERPL-MCNC: 191 MG/DL
CO2 SERPL-SCNC: 27 MMOL/L (ref 21–32)
CREAT SERPL-MCNC: 1.14 MG/DL (ref 0.55–1.02)
DIFFERENTIAL METHOD BLD: NORMAL
ECHO AO ASC DIAM: 3.9 CM
ECHO AO ASCENDING AORTA INDEX: 2 CM/M2
ECHO AO ROOT DIAM: 3.8 CM
ECHO AO ROOT INDEX: 1.95 CM/M2
ECHO AR MAX VEL PISA: 3.6 M/S
ECHO AV AREA PEAK VELOCITY: 2.3 CM2
ECHO AV AREA/BSA PEAK VELOCITY: 1.2 CM2/M2
ECHO AV MEAN GRADIENT: 10 MMHG
ECHO AV PEAK GRADIENT: 22 MMHG
ECHO AV PEAK VELOCITY: 2.4 M/S
ECHO AV REGURGITANT PHT: 378.3 MILLISECOND
ECHO AV VELOCITY RATIO: 0.63
ECHO BSA: 2 M2
ECHO BSA: 2 M2
ECHO LA DIAMETER INDEX: 1.59 CM/M2
ECHO LA DIAMETER: 3.1 CM
ECHO LA TO AORTIC ROOT RATIO: 0.82
ECHO LV E' LATERAL VELOCITY: 3 CM/S
ECHO LV E' SEPTAL VELOCITY: 4 CM/S
ECHO LV EDV A4C: 125 ML
ECHO LV EDV INDEX A4C: 64 ML/M2
ECHO LV EJECTION FRACTION A4C: 29 %
ECHO LV EJECTION FRACTION BIPLANE: 40 % (ref 55–100)
ECHO LV ESV A4C: 88 ML
ECHO LV ESV INDEX A4C: 45 ML/M2
ECHO LV FRACTIONAL SHORTENING: 33 % (ref 28–44)
ECHO LV INTERNAL DIMENSION DIASTOLE INDEX: 2.36 CM/M2
ECHO LV INTERNAL DIMENSION DIASTOLIC: 4.6 CM (ref 3.9–5.3)
ECHO LV INTERNAL DIMENSION SYSTOLIC INDEX: 1.59 CM/M2
ECHO LV INTERNAL DIMENSION SYSTOLIC: 3.1 CM
ECHO LV IVSD: 1.1 CM (ref 0.6–0.9)
ECHO LV MASS 2D: 181.2 G (ref 67–162)
ECHO LV MASS INDEX 2D: 92.9 G/M2 (ref 43–95)
ECHO LV POSTERIOR WALL DIASTOLIC: 1.1 CM (ref 0.6–0.9)
ECHO LV RELATIVE WALL THICKNESS RATIO: 0.48
ECHO LVOT AREA: 3.8 CM2
ECHO LVOT DIAM: 2.2 CM
ECHO LVOT PEAK GRADIENT: 9 MMHG
ECHO LVOT PEAK VELOCITY: 1.5 M/S
ECHO MV A VELOCITY: 1.4 M/S
ECHO MV E DECELERATION TIME (DT): 195.3 MS
ECHO MV E VELOCITY: 0.63 M/S
ECHO MV E/A RATIO: 0.45
ECHO MV E/E' LATERAL: 21
ECHO MV E/E' RATIO (AVERAGED): 18.38
ECHO MV E/E' SEPTAL: 15.75
ECHO PV MAX VELOCITY: 1.5 M/S
ECHO PV PEAK GRADIENT: 9 MMHG
ECHO RV FREE WALL PEAK S': 9 CM/S
ECHO RV TAPSE: 1.7 CM (ref 1.7–?)
ECHO TV REGURGITANT MAX VELOCITY: 2.9 M/S
ECHO TV REGURGITANT PEAK GRADIENT: 34 MMHG
EKG ATRIAL RATE: 70 BPM
EKG ATRIAL RATE: 83 BPM
EKG DIAGNOSIS: NORMAL
EKG P AXIS: 32 DEGREES
EKG P AXIS: 43 DEGREES
EKG P-R INTERVAL: 162 MS
EKG P-R INTERVAL: 166 MS
EKG Q-T INTERVAL: 278 MS
EKG Q-T INTERVAL: 384 MS
EKG Q-T INTERVAL: 438 MS
EKG QRS DURATION: 146 MS
EKG QRS DURATION: 156 MS
EKG QRS DURATION: 158 MS
EKG QTC CALCULATION (BAZETT): 386 MS
EKG QTC CALCULATION (BAZETT): 453 MS
EKG QTC CALCULATION (BAZETT): 514 MS
EKG R AXIS: -28 DEGREES
EKG R AXIS: -42 DEGREES
EKG R AXIS: -46 DEGREES
EKG T AXIS: 129 DEGREES
EKG T AXIS: 131 DEGREES
EKG T AXIS: 138 DEGREES
EKG VENTRICULAR RATE: 160 BPM
EKG VENTRICULAR RATE: 61 BPM
EKG VENTRICULAR RATE: 83 BPM
EOSINOPHIL # BLD: 0.4 K/UL (ref 0–0.4)
EOSINOPHIL NFR BLD: 4 % (ref 0–7)
ERYTHROCYTE [DISTWIDTH] IN BLOOD BY AUTOMATED COUNT: 12.6 % (ref 11.5–14.5)
EST. AVERAGE GLUCOSE BLD GHB EST-MCNC: 128 MG/DL
GLUCOSE BLD STRIP.AUTO-MCNC: 101 MG/DL (ref 65–117)
GLUCOSE SERPL-MCNC: 123 MG/DL (ref 65–100)
HBA1C MFR BLD: 6.1 % (ref 4–5.6)
HCT VFR BLD AUTO: 35.2 % (ref 35–47)
HDLC SERPL-MCNC: 62 MG/DL
HDLC SERPL: 3.1 (ref 0–5)
HGB BLD-MCNC: 11.7 G/DL (ref 11.5–16)
IMM GRANULOCYTES # BLD AUTO: 0 K/UL (ref 0–0.04)
IMM GRANULOCYTES NFR BLD AUTO: 0 % (ref 0–0.5)
INR PPP: 1 (ref 0.9–1.1)
INR PPP: 1 (ref 0.9–1.1)
LDLC SERPL CALC-MCNC: 105.2 MG/DL (ref 0–100)
LYMPHOCYTES # BLD: 1.7 K/UL (ref 0.8–3.5)
LYMPHOCYTES NFR BLD: 17 % (ref 12–49)
MCH RBC QN AUTO: 29.9 PG (ref 26–34)
MCHC RBC AUTO-ENTMCNC: 33.2 G/DL (ref 30–36.5)
MCV RBC AUTO: 90 FL (ref 80–99)
MONOCYTES # BLD: 0.8 K/UL (ref 0–1)
MONOCYTES NFR BLD: 8 % (ref 5–13)
NEUTS SEG # BLD: 7.4 K/UL (ref 1.8–8)
NEUTS SEG NFR BLD: 70 % (ref 32–75)
NRBC # BLD: 0 K/UL (ref 0–0.01)
NRBC BLD-RTO: 0 PER 100 WBC
PLATELET # BLD AUTO: 226 K/UL (ref 150–400)
PMV BLD AUTO: 9.3 FL (ref 8.9–12.9)
POTASSIUM SERPL-SCNC: 4 MMOL/L (ref 3.5–5.1)
PROTHROMBIN TIME: 10.9 SEC (ref 9–11.1)
PROTHROMBIN TIME: 10.9 SEC (ref 9–11.1)
RBC # BLD AUTO: 3.91 M/UL (ref 3.8–5.2)
SERVICE CMNT-IMP: NORMAL
SODIUM SERPL-SCNC: 141 MMOL/L (ref 136–145)
THERAPEUTIC RANGE: ABNORMAL SECS (ref 58–77)
THERAPEUTIC RANGE: NORMAL SECS (ref 58–77)
TRIGL SERPL-MCNC: 119 MG/DL
TROPONIN I SERPL HS-MCNC: 609 NG/L (ref 0–51)
TROPONIN I SERPL HS-MCNC: 625 NG/L (ref 0–51)
UFH PPP CHRO-ACNC: 0.56 IU/ML
UFH PPP CHRO-ACNC: 0.97 IU/ML
VLDLC SERPL CALC-MCNC: 23.8 MG/DL
WBC # BLD AUTO: 10.5 K/UL (ref 3.6–11)

## 2024-09-13 PROCEDURE — 85520 HEPARIN ASSAY: CPT

## 2024-09-13 PROCEDURE — 2580000003 HC RX 258: Performed by: STUDENT IN AN ORGANIZED HEALTH CARE EDUCATION/TRAINING PROGRAM

## 2024-09-13 PROCEDURE — C8929 TTE W OR WO FOL WCON,DOPPLER: HCPCS

## 2024-09-13 PROCEDURE — 6360000004 HC RX CONTRAST MEDICATION: Performed by: INTERNAL MEDICINE

## 2024-09-13 PROCEDURE — 6360000002 HC RX W HCPCS: Performed by: STUDENT IN AN ORGANIZED HEALTH CARE EDUCATION/TRAINING PROGRAM

## 2024-09-13 PROCEDURE — 85025 COMPLETE CBC W/AUTO DIFF WBC: CPT

## 2024-09-13 PROCEDURE — 93458 L HRT ARTERY/VENTRICLE ANGIO: CPT | Performed by: STUDENT IN AN ORGANIZED HEALTH CARE EDUCATION/TRAINING PROGRAM

## 2024-09-13 PROCEDURE — 85610 PROTHROMBIN TIME: CPT

## 2024-09-13 PROCEDURE — 6360000004 HC RX CONTRAST MEDICATION: Performed by: STUDENT IN AN ORGANIZED HEALTH CARE EDUCATION/TRAINING PROGRAM

## 2024-09-13 PROCEDURE — 6370000000 HC RX 637 (ALT 250 FOR IP): Performed by: STUDENT IN AN ORGANIZED HEALTH CARE EDUCATION/TRAINING PROGRAM

## 2024-09-13 PROCEDURE — 36415 COLL VENOUS BLD VENIPUNCTURE: CPT

## 2024-09-13 PROCEDURE — 82962 GLUCOSE BLOOD TEST: CPT

## 2024-09-13 PROCEDURE — 2709999900 HC NON-CHARGEABLE SUPPLY: Performed by: STUDENT IN AN ORGANIZED HEALTH CARE EDUCATION/TRAINING PROGRAM

## 2024-09-13 PROCEDURE — 85730 THROMBOPLASTIN TIME PARTIAL: CPT

## 2024-09-13 PROCEDURE — 99152 MOD SED SAME PHYS/QHP 5/>YRS: CPT | Performed by: STUDENT IN AN ORGANIZED HEALTH CARE EDUCATION/TRAINING PROGRAM

## 2024-09-13 PROCEDURE — 84484 ASSAY OF TROPONIN QUANT: CPT

## 2024-09-13 PROCEDURE — 4A023N7 MEASUREMENT OF CARDIAC SAMPLING AND PRESSURE, LEFT HEART, PERCUTANEOUS APPROACH: ICD-10-PCS | Performed by: STUDENT IN AN ORGANIZED HEALTH CARE EDUCATION/TRAINING PROGRAM

## 2024-09-13 PROCEDURE — C1894 INTRO/SHEATH, NON-LASER: HCPCS | Performed by: STUDENT IN AN ORGANIZED HEALTH CARE EDUCATION/TRAINING PROGRAM

## 2024-09-13 PROCEDURE — 2060000000 HC ICU INTERMEDIATE R&B

## 2024-09-13 PROCEDURE — 6370000000 HC RX 637 (ALT 250 FOR IP): Performed by: INTERNAL MEDICINE

## 2024-09-13 PROCEDURE — C1769 GUIDE WIRE: HCPCS | Performed by: STUDENT IN AN ORGANIZED HEALTH CARE EDUCATION/TRAINING PROGRAM

## 2024-09-13 PROCEDURE — 2500000003 HC RX 250 WO HCPCS: Performed by: STUDENT IN AN ORGANIZED HEALTH CARE EDUCATION/TRAINING PROGRAM

## 2024-09-13 PROCEDURE — 80048 BASIC METABOLIC PNL TOTAL CA: CPT

## 2024-09-13 PROCEDURE — B2111ZZ FLUOROSCOPY OF MULTIPLE CORONARY ARTERIES USING LOW OSMOLAR CONTRAST: ICD-10-PCS | Performed by: STUDENT IN AN ORGANIZED HEALTH CARE EDUCATION/TRAINING PROGRAM

## 2024-09-13 RX ORDER — LIDOCAINE HYDROCHLORIDE 10 MG/ML
INJECTION, SOLUTION INFILTRATION; PERINEURAL PRN
Status: DISCONTINUED | OUTPATIENT
Start: 2024-09-13 | End: 2024-09-13 | Stop reason: HOSPADM

## 2024-09-13 RX ORDER — HEPARIN SODIUM 1000 [USP'U]/ML
INJECTION, SOLUTION INTRAVENOUS; SUBCUTANEOUS PRN
Status: DISCONTINUED | OUTPATIENT
Start: 2024-09-13 | End: 2024-09-13 | Stop reason: HOSPADM

## 2024-09-13 RX ORDER — HEPARIN SODIUM 10000 [USP'U]/ML
INJECTION, SOLUTION INTRAVENOUS; SUBCUTANEOUS PRN
Status: DISCONTINUED | OUTPATIENT
Start: 2024-09-13 | End: 2024-09-13 | Stop reason: HOSPADM

## 2024-09-13 RX ORDER — VERAPAMIL HYDROCHLORIDE 2.5 MG/ML
INJECTION, SOLUTION INTRAVENOUS PRN
Status: DISCONTINUED | OUTPATIENT
Start: 2024-09-13 | End: 2024-09-13 | Stop reason: HOSPADM

## 2024-09-13 RX ORDER — METOPROLOL SUCCINATE 50 MG/1
50 TABLET, EXTENDED RELEASE ORAL DAILY
Status: DISCONTINUED | OUTPATIENT
Start: 2024-09-14 | End: 2024-09-14 | Stop reason: HOSPADM

## 2024-09-13 RX ORDER — SODIUM CHLORIDE 0.9 % (FLUSH) 0.9 %
5-40 SYRINGE (ML) INJECTION PRN
Status: DISCONTINUED | OUTPATIENT
Start: 2024-09-13 | End: 2024-09-14 | Stop reason: HOSPADM

## 2024-09-13 RX ORDER — SODIUM CHLORIDE 0.9 % (FLUSH) 0.9 %
5-40 SYRINGE (ML) INJECTION EVERY 12 HOURS SCHEDULED
Status: DISCONTINUED | OUTPATIENT
Start: 2024-09-13 | End: 2024-09-14 | Stop reason: HOSPADM

## 2024-09-13 RX ORDER — METOPROLOL SUCCINATE 25 MG/1
25 TABLET, EXTENDED RELEASE ORAL ONCE
Status: COMPLETED | OUTPATIENT
Start: 2024-09-13 | End: 2024-09-13

## 2024-09-13 RX ORDER — ATORVASTATIN CALCIUM 40 MG/1
40 TABLET, FILM COATED ORAL DAILY
Status: DISCONTINUED | OUTPATIENT
Start: 2024-09-13 | End: 2024-09-14 | Stop reason: HOSPADM

## 2024-09-13 RX ORDER — FENTANYL CITRATE 50 UG/ML
INJECTION, SOLUTION INTRAMUSCULAR; INTRAVENOUS PRN
Status: DISCONTINUED | OUTPATIENT
Start: 2024-09-13 | End: 2024-09-13 | Stop reason: HOSPADM

## 2024-09-13 RX ORDER — METOPROLOL SUCCINATE 25 MG/1
25 TABLET, EXTENDED RELEASE ORAL DAILY
Status: DISCONTINUED | OUTPATIENT
Start: 2024-09-13 | End: 2024-09-13

## 2024-09-13 RX ORDER — HYDRALAZINE HYDROCHLORIDE 20 MG/ML
10 INJECTION INTRAMUSCULAR; INTRAVENOUS EVERY 10 MIN PRN
Status: DISCONTINUED | OUTPATIENT
Start: 2024-09-13 | End: 2024-09-14 | Stop reason: HOSPADM

## 2024-09-13 RX ORDER — SODIUM CHLORIDE 9 MG/ML
INJECTION, SOLUTION INTRAVENOUS PRN
Status: DISCONTINUED | OUTPATIENT
Start: 2024-09-13 | End: 2024-09-14 | Stop reason: HOSPADM

## 2024-09-13 RX ORDER — ACETAMINOPHEN 325 MG/1
650 TABLET ORAL EVERY 4 HOURS PRN
Status: DISCONTINUED | OUTPATIENT
Start: 2024-09-13 | End: 2024-09-14 | Stop reason: HOSPADM

## 2024-09-13 RX ORDER — HEPARIN SODIUM 10000 [USP'U]/100ML
5-30 INJECTION, SOLUTION INTRAVENOUS CONTINUOUS
Status: DISCONTINUED | OUTPATIENT
Start: 2024-09-13 | End: 2024-09-13

## 2024-09-13 RX ORDER — GUAIFENESIN 600 MG/1
600 TABLET, EXTENDED RELEASE ORAL 2 TIMES DAILY PRN
Status: DISCONTINUED | OUTPATIENT
Start: 2024-09-13 | End: 2024-09-14 | Stop reason: HOSPADM

## 2024-09-13 RX ORDER — IOPAMIDOL 755 MG/ML
INJECTION, SOLUTION INTRAVASCULAR PRN
Status: DISCONTINUED | OUTPATIENT
Start: 2024-09-13 | End: 2024-09-13 | Stop reason: HOSPADM

## 2024-09-13 RX ORDER — NITROGLYCERIN 0.4 MG/1
0.4 TABLET SUBLINGUAL EVERY 5 MIN PRN
Status: DISCONTINUED | OUTPATIENT
Start: 2024-09-13 | End: 2024-09-14 | Stop reason: HOSPADM

## 2024-09-13 RX ADMIN — LOSARTAN POTASSIUM 50 MG: 50 TABLET, FILM COATED ORAL at 08:30

## 2024-09-13 RX ADMIN — BENZONATATE 100 MG: 100 CAPSULE ORAL at 21:17

## 2024-09-13 RX ADMIN — ASPIRIN 81 MG: 81 TABLET, CHEWABLE ORAL at 08:30

## 2024-09-13 RX ADMIN — SODIUM CHLORIDE, PRESERVATIVE FREE 10 ML: 5 INJECTION INTRAVENOUS at 21:18

## 2024-09-13 RX ADMIN — HEPARIN SODIUM AND DEXTROSE 12 UNITS/KG/HR: 10000; 5 INJECTION INTRAVENOUS at 02:16

## 2024-09-13 RX ADMIN — METOPROLOL SUCCINATE 25 MG: 25 TABLET, EXTENDED RELEASE ORAL at 08:30

## 2024-09-13 RX ADMIN — METOPROLOL SUCCINATE 25 MG: 25 TABLET, EXTENDED RELEASE ORAL at 14:41

## 2024-09-13 RX ADMIN — ATORVASTATIN CALCIUM 40 MG: 40 TABLET, FILM COATED ORAL at 08:30

## 2024-09-13 RX ADMIN — GUAIFENESIN 600 MG: 600 TABLET, EXTENDED RELEASE ORAL at 11:27

## 2024-09-13 RX ADMIN — HEPARIN SODIUM AND DEXTROSE 10 UNITS/KG/HR: 10000; 5 INJECTION INTRAVENOUS at 11:11

## 2024-09-13 RX ADMIN — PERFLUTREN 1.5 ML: 6.52 INJECTION, SUSPENSION INTRAVENOUS at 11:12

## 2024-09-13 RX ADMIN — GUAIFENESIN 600 MG: 600 TABLET, EXTENDED RELEASE ORAL at 21:17

## 2024-09-13 RX ADMIN — BENZONATATE 100 MG: 100 CAPSULE ORAL at 00:38

## 2024-09-13 RX ADMIN — SODIUM CHLORIDE, PRESERVATIVE FREE 10 ML: 5 INJECTION INTRAVENOUS at 08:31

## 2024-09-13 ASSESSMENT — PAIN SCALES - GENERAL
PAINLEVEL_OUTOF10: 0

## 2024-09-13 NOTE — ED PROVIDER NOTES
EMERGENCY DEPARTMENT HISTORY AND PHYSICAL EXAM      Date: 9/12/2024  Patient Name: Yana Esquivel    History of Presenting Illness     Chief Complaint   Patient presents with    Chest Pain       History Provided By: Patient    HPI: Yana Esquivel, 89 y.o. female with PMHx as noted alert family via treatment of chest pain.  Patient had onset of pain in her mid back radiating to her chest and jaw earlier this morning.  She reports pain has since eased up as only experiencing some minimal jaw discomfort at this time.  PCP: No primary care provider on file.    No current facility-administered medications for this encounter.     Current Outpatient Medications   Medication Sig Dispense Refill    atorvastatin (LIPITOR) 40 MG tablet Take 1 tablet by mouth daily      losartan (COZAAR) 50 MG tablet Take 1 tablet by mouth daily      albuterol sulfate HFA (VENTOLIN HFA) 108 (90 Base) MCG/ACT inhaler Inhale 2 puffs into the lungs every 6 hours as needed for Wheezing      amLODIPine (NORVASC) 5 MG tablet Take 1 tablet by mouth daily 30 tablet 0     Facility-Administered Medications Ordered in Other Encounters   Medication Dose Route Frequency Provider Last Rate Last Admin    heparin (porcine) injection 4,000 Units  4,000 Units IntraVENous PRN Jose Mon,         heparin (porcine) injection 2,000 Units  2,000 Units IntraVENous PRN Jose Mon, DO        [START ON 9/13/2024] atorvastatin (LIPITOR) tablet 80 mg  80 mg Oral Daily Jose Mon DO        losartan (COZAAR) tablet 50 mg  50 mg Oral Daily Jose Mon, DO        sodium chloride flush 0.9 % injection 5-40 mL  5-40 mL IntraVENous 2 times per day Jose Mon,         sodium chloride flush 0.9 % injection 5-40 mL  5-40 mL IntraVENous PRN Jose Mon, DO        0.9 % sodium chloride infusion   IntraVENous PRN Jose Mon, DO        ondansetron (ZOFRAN-ODT) disintegrating tablet 4 mg  4 mg Oral Q8H  105/55 96 %   09/12/24 1030 -- 83 15 -- 98 %   09/12/24 1028 -- 85 14 -- 99 %   09/12/24 1001 98 °F (36.7 °C) 81 -- -- --   09/12/24 1000 -- 82 23 122/67 97 %   09/12/24 0951 -- 80 18 130/84 97 %             EKG interpretation: (Preliminary)  Rhythm: normal sinus rhythm; and regular . Rate (approx.): 83 P wave: normal; QRS interval: Wide, with moderate block pattern; ST/T wave: T inversion;  was interpreted by Thaddeus Pa MD,ED Provider.        Provider Notes (Medical Decision Making):   On presentation, the patient is well appearing, in no acute distress with normal vital signs.  Based on my history and exam the differential diagnosis for this patient includes ACS, PE, dissection, musculoskeletal pain, GERD, intra-abdominal pathology, pneumothorax.    EKG showed left bundle branch block which was present on previous EKG.  Troponin was negative however subsequent repeat troponin showed significant change.  Reassessed patient reports he is now entirely pain-free this was given dose of Lovenox along with aspirin while in the ED.  Due to elevated troponin will plan to transfer to [facility] for formal cardiology evaluation.    ED Course:   Initial assessment performed. The patients presenting problems have been discussed, and they are in agreement with the care plan formulated and outlined with them.  I have encouraged them to ask questions as they arise throughout their visit.    Patient was given the following medications:  Medications   aluminum & magnesium hydroxide-simethicone (MAALOX) 200-200-20 MG/5ML suspension 30 mL (30 mLs Oral Given 9/12/24 1020)   lidocaine viscous hcl (XYLOCAINE) 2 % solution 15 mL (15 mLs Mouth/Throat Given 9/12/24 1020)   aspirin chewable tablet 324 mg (324 mg Oral Given 9/12/24 1306)   enoxaparin (LOVENOX) injection 80 mg (80 mg SubCUTAneous Given 9/12/24 1444)            PROGRESS:  The patient has been re-evaluated and sx have improved. Reviewed available

## 2024-09-14 VITALS
SYSTOLIC BLOOD PRESSURE: 144 MMHG | HEART RATE: 69 BPM | BODY MASS INDEX: 30.19 KG/M2 | DIASTOLIC BLOOD PRESSURE: 60 MMHG | RESPIRATION RATE: 16 BRPM | OXYGEN SATURATION: 93 % | TEMPERATURE: 98.6 F | HEIGHT: 66 IN | WEIGHT: 187.83 LBS

## 2024-09-14 PROBLEM — I21.4 NSTEMI (NON-ST ELEVATED MYOCARDIAL INFARCTION) (HCC): Status: RESOLVED | Noted: 2024-09-12 | Resolved: 2024-09-14

## 2024-09-14 LAB
ANION GAP SERPL CALC-SCNC: 6 MMOL/L (ref 2–12)
BUN SERPL-MCNC: 13 MG/DL (ref 6–20)
BUN/CREAT SERPL: 13 (ref 12–20)
CALCIUM SERPL-MCNC: 8.4 MG/DL (ref 8.5–10.1)
CHLORIDE SERPL-SCNC: 108 MMOL/L (ref 97–108)
CO2 SERPL-SCNC: 26 MMOL/L (ref 21–32)
CREAT SERPL-MCNC: 1.03 MG/DL (ref 0.55–1.02)
ERYTHROCYTE [DISTWIDTH] IN BLOOD BY AUTOMATED COUNT: 12.1 % (ref 11.5–14.5)
GLUCOSE SERPL-MCNC: 111 MG/DL (ref 65–100)
HCT VFR BLD AUTO: 36 % (ref 35–47)
HGB BLD-MCNC: 11.9 G/DL (ref 11.5–16)
MAGNESIUM SERPL-MCNC: 2.1 MG/DL (ref 1.6–2.4)
MCH RBC QN AUTO: 30.1 PG (ref 26–34)
MCHC RBC AUTO-ENTMCNC: 33.1 G/DL (ref 30–36.5)
MCV RBC AUTO: 91.1 FL (ref 80–99)
NRBC # BLD: 0 K/UL (ref 0–0.01)
NRBC BLD-RTO: 0 PER 100 WBC
PHOSPHATE SERPL-MCNC: 3 MG/DL (ref 2.6–4.7)
PLATELET # BLD AUTO: 200 K/UL (ref 150–400)
PMV BLD AUTO: 9.6 FL (ref 8.9–12.9)
POTASSIUM SERPL-SCNC: 4 MMOL/L (ref 3.5–5.1)
RBC # BLD AUTO: 3.95 M/UL (ref 3.8–5.2)
SODIUM SERPL-SCNC: 140 MMOL/L (ref 136–145)
TROPONIN I SERPL HS-MCNC: 283 NG/L (ref 0–51)
WBC # BLD AUTO: 11 K/UL (ref 3.6–11)

## 2024-09-14 PROCEDURE — 6370000000 HC RX 637 (ALT 250 FOR IP): Performed by: STUDENT IN AN ORGANIZED HEALTH CARE EDUCATION/TRAINING PROGRAM

## 2024-09-14 PROCEDURE — 84100 ASSAY OF PHOSPHORUS: CPT

## 2024-09-14 PROCEDURE — 84484 ASSAY OF TROPONIN QUANT: CPT

## 2024-09-14 PROCEDURE — 36415 COLL VENOUS BLD VENIPUNCTURE: CPT

## 2024-09-14 PROCEDURE — 80048 BASIC METABOLIC PNL TOTAL CA: CPT

## 2024-09-14 PROCEDURE — 85027 COMPLETE CBC AUTOMATED: CPT

## 2024-09-14 PROCEDURE — 2580000003 HC RX 258: Performed by: STUDENT IN AN ORGANIZED HEALTH CARE EDUCATION/TRAINING PROGRAM

## 2024-09-14 PROCEDURE — 83735 ASSAY OF MAGNESIUM: CPT

## 2024-09-14 RX ORDER — NITROGLYCERIN 0.4 MG/1
0.4 TABLET SUBLINGUAL EVERY 5 MIN PRN
Qty: 25 TABLET | Refills: 3 | Status: SHIPPED | OUTPATIENT
Start: 2024-09-14

## 2024-09-14 RX ORDER — METOPROLOL SUCCINATE 50 MG/1
50 TABLET, EXTENDED RELEASE ORAL DAILY
Qty: 30 TABLET | Refills: 3 | Status: SHIPPED | OUTPATIENT
Start: 2024-09-15

## 2024-09-14 RX ORDER — BENZONATATE 100 MG/1
100 CAPSULE ORAL 3 TIMES DAILY PRN
Qty: 21 CAPSULE | Refills: 0 | Status: SHIPPED | OUTPATIENT
Start: 2024-09-14 | End: 2024-09-21

## 2024-09-14 RX ORDER — ASPIRIN 81 MG/1
81 TABLET, CHEWABLE ORAL DAILY
Qty: 30 TABLET | Refills: 3 | Status: SHIPPED | OUTPATIENT
Start: 2024-09-15

## 2024-09-14 RX ADMIN — BENZONATATE 100 MG: 100 CAPSULE ORAL at 09:31

## 2024-09-14 RX ADMIN — SODIUM CHLORIDE, PRESERVATIVE FREE 10 ML: 5 INJECTION INTRAVENOUS at 09:33

## 2024-09-14 RX ADMIN — ASPIRIN 81 MG: 81 TABLET, CHEWABLE ORAL at 09:31

## 2024-09-14 RX ADMIN — METOPROLOL SUCCINATE 50 MG: 50 TABLET, EXTENDED RELEASE ORAL at 09:31

## 2024-09-14 RX ADMIN — LOSARTAN POTASSIUM 50 MG: 50 TABLET, FILM COATED ORAL at 09:30

## 2024-09-14 ASSESSMENT — PAIN SCALES - GENERAL: PAINLEVEL_OUTOF10: 0

## 2024-09-18 ENCOUNTER — APPOINTMENT (OUTPATIENT)
Facility: HOSPITAL | Age: 89
End: 2024-09-18
Payer: MEDICARE

## 2024-09-18 ENCOUNTER — HOSPITAL ENCOUNTER (EMERGENCY)
Facility: HOSPITAL | Age: 89
Discharge: HOME | End: 2024-09-18
Attending: STUDENT IN AN ORGANIZED HEALTH CARE EDUCATION/TRAINING PROGRAM
Payer: MEDICARE

## 2024-09-18 VITALS
WEIGHT: 187 LBS | RESPIRATION RATE: 24 BRPM | HEART RATE: 65 BPM | BODY MASS INDEX: 31.16 KG/M2 | HEIGHT: 65 IN | TEMPERATURE: 98.7 F | SYSTOLIC BLOOD PRESSURE: 137 MMHG | OXYGEN SATURATION: 94 % | DIASTOLIC BLOOD PRESSURE: 51 MMHG

## 2024-09-18 DIAGNOSIS — R05.9 COUGH, UNSPECIFIED TYPE: Primary | ICD-10-CM

## 2024-09-18 LAB
ANION GAP SERPL CALC-SCNC: 4 MMOL/L (ref 3–18)
BASOPHILS # BLD: 0.1 K/UL (ref 0–0.1)
BASOPHILS NFR BLD: 1 % (ref 0–2)
BUN SERPL-MCNC: 15 MG/DL (ref 7–18)
BUN/CREAT SERPL: 12 (ref 12–20)
CALCIUM SERPL-MCNC: 8.9 MG/DL (ref 8.5–10.1)
CHLORIDE SERPL-SCNC: 109 MMOL/L (ref 100–111)
CO2 SERPL-SCNC: 27 MMOL/L (ref 21–32)
CREAT SERPL-MCNC: 1.27 MG/DL (ref 0.6–1.3)
DIFFERENTIAL METHOD BLD: ABNORMAL
EKG ATRIAL RATE: 70 BPM
EKG DIAGNOSIS: NORMAL
EKG P AXIS: 42 DEGREES
EKG P-R INTERVAL: 168 MS
EKG Q-T INTERVAL: 440 MS
EKG QRS DURATION: 164 MS
EKG QTC CALCULATION (BAZETT): 475 MS
EKG R AXIS: -23 DEGREES
EKG T AXIS: 137 DEGREES
EKG VENTRICULAR RATE: 70 BPM
EOSINOPHIL # BLD: 0.5 K/UL (ref 0–0.4)
EOSINOPHIL NFR BLD: 4 % (ref 0–5)
ERYTHROCYTE [DISTWIDTH] IN BLOOD BY AUTOMATED COUNT: 12.2 % (ref 11.6–14.5)
FLUAV RNA SPEC QL NAA+PROBE: NOT DETECTED
FLUBV RNA SPEC QL NAA+PROBE: NOT DETECTED
GLUCOSE SERPL-MCNC: 137 MG/DL (ref 74–99)
HCT VFR BLD AUTO: 39 % (ref 35–45)
HGB BLD-MCNC: 12.9 G/DL (ref 12–16)
IMM GRANULOCYTES # BLD AUTO: 0 K/UL (ref 0–0.04)
IMM GRANULOCYTES NFR BLD AUTO: 0 % (ref 0–0.5)
LYMPHOCYTES # BLD: 1.5 K/UL (ref 0.9–3.6)
LYMPHOCYTES NFR BLD: 11 % (ref 21–52)
MCH RBC QN AUTO: 30.2 PG (ref 24–34)
MCHC RBC AUTO-ENTMCNC: 33.1 G/DL (ref 31–37)
MCV RBC AUTO: 91.3 FL (ref 78–100)
MONOCYTES # BLD: 1.2 K/UL (ref 0.05–1.2)
MONOCYTES NFR BLD: 9 % (ref 3–10)
NEUTS SEG # BLD: 10.1 K/UL (ref 1.8–8)
NEUTS SEG NFR BLD: 76 % (ref 40–73)
NRBC # BLD: 0 K/UL (ref 0–0.01)
NRBC BLD-RTO: 0 PER 100 WBC
PLATELET # BLD AUTO: 277 K/UL (ref 135–420)
PMV BLD AUTO: 9.2 FL (ref 9.2–11.8)
POTASSIUM SERPL-SCNC: 4.4 MMOL/L (ref 3.5–5.5)
RBC # BLD AUTO: 4.27 M/UL (ref 4.2–5.3)
SARS-COV-2 RNA RESP QL NAA+PROBE: NOT DETECTED
SODIUM SERPL-SCNC: 140 MMOL/L (ref 136–145)
SOURCE: NORMAL
TROPONIN I SERPL HS-MCNC: 35 NG/L (ref 0–54)
WBC # BLD AUTO: 13.4 K/UL (ref 4.6–13.2)

## 2024-09-18 PROCEDURE — 80048 BASIC METABOLIC PNL TOTAL CA: CPT

## 2024-09-18 PROCEDURE — 71045 X-RAY EXAM CHEST 1 VIEW: CPT

## 2024-09-18 PROCEDURE — 6370000000 HC RX 637 (ALT 250 FOR IP): Performed by: STUDENT IN AN ORGANIZED HEALTH CARE EDUCATION/TRAINING PROGRAM

## 2024-09-18 PROCEDURE — 6370000000 HC RX 637 (ALT 250 FOR IP)

## 2024-09-18 PROCEDURE — 93005 ELECTROCARDIOGRAM TRACING: CPT | Performed by: STUDENT IN AN ORGANIZED HEALTH CARE EDUCATION/TRAINING PROGRAM

## 2024-09-18 PROCEDURE — 93010 ELECTROCARDIOGRAM REPORT: CPT | Performed by: INTERNAL MEDICINE

## 2024-09-18 PROCEDURE — 84484 ASSAY OF TROPONIN QUANT: CPT

## 2024-09-18 PROCEDURE — 85025 COMPLETE CBC W/AUTO DIFF WBC: CPT

## 2024-09-18 PROCEDURE — 99285 EMERGENCY DEPT VISIT HI MDM: CPT

## 2024-09-18 PROCEDURE — 87636 SARSCOV2 & INF A&B AMP PRB: CPT

## 2024-09-18 RX ORDER — BENZONATATE 100 MG/1
100 CAPSULE ORAL 3 TIMES DAILY PRN
Qty: 30 CAPSULE | Refills: 0 | Status: SHIPPED | OUTPATIENT
Start: 2024-09-18 | End: 2024-09-28

## 2024-09-18 RX ORDER — GUAIFENESIN/DEXTROMETHORPHAN 100-10MG/5
10 SYRUP ORAL ONCE
Status: COMPLETED | OUTPATIENT
Start: 2024-09-18 | End: 2024-09-18

## 2024-09-18 RX ORDER — BENZONATATE 100 MG/1
100 CAPSULE ORAL ONCE
Status: COMPLETED | OUTPATIENT
Start: 2024-09-18 | End: 2024-09-18

## 2024-09-18 RX ADMIN — GUAIFENESIN AND DEXTROMETHORPHAN 10 ML: 100; 10 SYRUP ORAL at 12:11

## 2024-09-18 RX ADMIN — BENZONATATE 100 MG: 100 CAPSULE ORAL at 12:11

## 2024-09-18 ASSESSMENT — ENCOUNTER SYMPTOMS
SHORTNESS OF BREATH: 1
RHINORRHEA: 0
COUGH: 1
DIARRHEA: 0
VOMITING: 0
NAUSEA: 0
ABDOMINAL PAIN: 0
SORE THROAT: 0

## 2024-09-18 ASSESSMENT — PAIN - FUNCTIONAL ASSESSMENT: PAIN_FUNCTIONAL_ASSESSMENT: NONE - DENIES PAIN

## (undated) DEVICE — HI-TORQUE VERSACORE FLOPPY GUIDE WIRE SYSTEM 145 CM: Brand: HI-TORQUE VERSACORE

## (undated) DEVICE — HEART CATH-MRMC: Brand: MEDLINE INDUSTRIES, INC.

## (undated) DEVICE — SPLINT WR VELC FOAM NEUT POS DISP FOR RAD ART ACC SFT STRP

## (undated) DEVICE — SYRINGE ANGIO 10 CC BRL STD PRNT POLYCARB LT BLU MEDALLION

## (undated) DEVICE — PROVE COVER: Brand: UNBRANDED

## (undated) DEVICE — GUIDEWIRE VASC L260CM 0.035IN J TIP L3MM PTFE FIX COR NAMIC

## (undated) DEVICE — GLIDESHEATH SLENDER STAINLESS STEEL KIT: Brand: GLIDESHEATH SLENDER

## (undated) DEVICE — KIT ACCS INTRO 4FR L10CM NDL 21GA L7CM GWIRE L40CM

## (undated) DEVICE — TUBING PRSS MON L6IN PVC M FEM CONN

## (undated) DEVICE — CATHETER COR DIAG 4.0 5FR 100CM 0 SIDE H